# Patient Record
Sex: FEMALE | Race: AMERICAN INDIAN OR ALASKA NATIVE | Employment: UNEMPLOYED | URBAN - METROPOLITAN AREA
[De-identification: names, ages, dates, MRNs, and addresses within clinical notes are randomized per-mention and may not be internally consistent; named-entity substitution may affect disease eponyms.]

---

## 2022-03-23 ENCOUNTER — OFFICE VISIT (OUTPATIENT)
Dept: FAMILY MEDICINE | Facility: CLINIC | Age: 22
End: 2022-03-23
Payer: COMMERCIAL

## 2022-03-23 VITALS
BODY MASS INDEX: 43.12 KG/M2 | HEIGHT: 68 IN | DIASTOLIC BLOOD PRESSURE: 78 MMHG | WEIGHT: 284.5 LBS | HEART RATE: 90 BPM | SYSTOLIC BLOOD PRESSURE: 122 MMHG | TEMPERATURE: 98.9 F | OXYGEN SATURATION: 97 %

## 2022-03-23 DIAGNOSIS — Z78.9 PERSON LIVING IN RESIDENTIAL INSTITUTION: ICD-10-CM

## 2022-03-23 DIAGNOSIS — Z11.4 SCREENING FOR HIV (HUMAN IMMUNODEFICIENCY VIRUS): ICD-10-CM

## 2022-03-23 DIAGNOSIS — Z00.00 ROUTINE HISTORY AND PHYSICAL EXAMINATION OF ADULT: Primary | ICD-10-CM

## 2022-03-23 PROCEDURE — 87389 HIV-1 AG W/HIV-1&-2 AB AG IA: CPT

## 2022-03-23 PROCEDURE — 86481 TB AG RESPONSE T-CELL SUSP: CPT

## 2022-03-23 RX ORDER — ACETAMINOPHEN 325 MG/1
325-650 TABLET ORAL EVERY 6 HOURS PRN
COMMUNITY

## 2022-03-23 RX ORDER — LURASIDONE HYDROCHLORIDE 40 MG/1
TABLET, FILM COATED ORAL
COMMUNITY

## 2022-03-23 RX ORDER — SERTRALINE HYDROCHLORIDE 100 MG/1
100 TABLET, FILM COATED ORAL DAILY
COMMUNITY

## 2022-03-23 ASSESSMENT — ANXIETY QUESTIONNAIRES
GAD7 TOTAL SCORE: 0
2. NOT BEING ABLE TO STOP OR CONTROL WORRYING: NOT AT ALL
5. BEING SO RESTLESS THAT IT IS HARD TO SIT STILL: NOT AT ALL
6. BECOMING EASILY ANNOYED OR IRRITABLE: NOT AT ALL
7. FEELING AFRAID AS IF SOMETHING AWFUL MIGHT HAPPEN: NOT AT ALL
3. WORRYING TOO MUCH ABOUT DIFFERENT THINGS: NOT AT ALL
IF YOU CHECKED OFF ANY PROBLEMS ON THIS QUESTIONNAIRE, HOW DIFFICULT HAVE THESE PROBLEMS MADE IT FOR YOU TO DO YOUR WORK, TAKE CARE OF THINGS AT HOME, OR GET ALONG WITH OTHER PEOPLE: NOT DIFFICULT AT ALL
1. FEELING NERVOUS, ANXIOUS, OR ON EDGE: NOT AT ALL

## 2022-03-23 ASSESSMENT — PATIENT HEALTH QUESTIONNAIRE - PHQ9
SUM OF ALL RESPONSES TO PHQ QUESTIONS 1-9: 6
5. POOR APPETITE OR OVEREATING: NOT AT ALL

## 2022-03-23 NOTE — NURSING NOTE
"ROOM:1    Preferred Name: Ida     21 year old  Chief Complaint   Patient presents with     Physical       Blood pressure 122/78, pulse 90, temperature 98.9  F (37.2  C), temperature source Oral, height 1.715 m (5' 7.5\"), weight 129 kg (284 lb 8 oz), last menstrual period 08/15/2021, SpO2 97 %. Body mass index is 43.9 kg/m .      There is no problem list on file for this patient.      Wt Readings from Last 2 Encounters:   03/23/22 129 kg (284 lb 8 oz)     BP Readings from Last 3 Encounters:   03/23/22 122/78       Allergies   Allergen Reactions     Lactose        Current Outpatient Medications   Medication     acetaminophen (TYLENOL) 325 MG tablet     lurasidone (LATUDA) 40 MG TABS tablet     Prenatal MV-Min-Fe Fum-FA-DHA (PRENATAL 1 PO)     sertraline (ZOLOFT) 100 MG tablet     No current facility-administered medications for this visit.       Social History     Tobacco Use     Smoking status: Former Smoker     Smokeless tobacco: Never Used   Vaping Use     Vaping Use: Former   Substance Use Topics     Alcohol use: Not Currently     Drug use: Not Currently     Types: Opiates, Methamphetamines       Honoring Choices - Health Care Directive Guide offered to patient at time of visit.    Health Maintenance Due   Topic Date Due     PREVENTIVE CARE VISIT  Never done     CHLAMYDIA SCREENING  Never done     ADVANCE CARE PLANNING  Never done     HIV SCREENING  Never done     HEPATITIS C SCREENING  Never done     PAP  Never done     COVID-19 Vaccine (3 - Booster for Pfizer series) 12/14/2021       Immunization History   Administered Date(s) Administered     COVID-19,PF,Pfizer (12+ Yrs) 05/25/2021, 07/14/2021     DTAP (<7y) 1998, 03/09/1999, 04/20/1999, 01/04/2000     DTaP / Hep B / IPV 09/01/2004     Flu, Unspecified 01/28/2010     HPV Quadrivalent 12/19/2012, 03/22/2016     HepA-Adult 01/28/2010     HepA-ped 2 Dose 09/01/2004     HepB, Unspecified 1998, 04/20/1999     HepB-Adult 02/21/2020     Historic Hib " Hib-titer 1998, 03/09/1999     Influenza (H1N1) 01/28/2010     Influenza Vaccine IM > 6 months Valent IIV4 (Alfuria,Fluzone) 02/21/2020, 12/13/2021     MMR 11/02/1999, 09/01/2004     Meningococcal (Menactra ) 12/19/2012, 03/22/2016     Pedvax-hib 11/02/1999     Poliovirus, inactivated (IPV) 1998, 03/09/1999, 01/04/2000     TD (ADULT, 7+) 02/21/2020     Tdap (Adacel,Boostrix) 01/28/2010, 03/01/2022     Varicella 11/02/1999, 12/19/2012       No results found for: PAP      No lab results found.    No flowsheet data found.    PHQ-9 SCORE 3/23/2022   PHQ-9 Total Score 6       LALO-7 SCORE 3/23/2022   Total Score 0       No flowsheet data found.      Rosana Alva, [facility]  March 23, 2022 12:56 PM

## 2022-03-23 NOTE — PROGRESS NOTES
Patient: Ida Bruce YOB: 2000  Date of Exam:    Arrival Time: 12 34 PM  Departure Time:  PM    Ida Bruce is a 21 year old  who is 31w3d here to establish care.  CARTER 20. She has a PMH of polysubstance abuse (IV meth, heroin, ETOH), hep C, abnormal PAP, syphilis, and auditory hallucinations. She is currently in substance abuse treatment at [facility]. She arrived at  on 3/16 as a transfer from another treatment center (2021-3/15/2022). Was in [facility] from -2021). She has been sober from drugs and alcohol since 2021. No cigarette use since . Plans to enroll in year long treatment program. Wants to come to [facility] clinic for her primary care after delivery.    Previously received regular prenatal care at [facility] in [facility]. She has an appt with [facility] tomorrow to re-establish prenatal care.     PMH  Hx of abnormal PAP smear (not sure of the results)  Hep C positive (found out while she was pregnant, has not received tx)  Hx of syphilis treatment (treated May 2019 and 2021), has not been with partner since treatment    Concerns for Eating Disorder  Gained 60 lbs since pregnancy, watching her diet but not sure what to eat  Hx of eating disorder when using drugs in the past, she would not eat.   In high school had a eating disorder (excessive eating;no bulimia or anorexia)  Counseler at [facility] is referring her to the [facility]    Mood/Support  Hx of anxiety and depression  Hx of auditory hallucinations. No other types of hallucinations (visual, tactile)  Currently on Lurasidone for hallucinations. Significant decrease in frequency of hallucinations. Does not notice them as much anymore. No command hallucinations. No SI/HI.  Has a , very supportive, also a close friend Ritika (also in treatment at [facility])  Partner not involved    ROS  No vaginal bleeding, abnormal discharge, or leaking of fluids  Feels baby  "move frequently, no change in movement  No swelling of hands or feet, no HA or visual changes  Intermittently nauseous with fatty foods or high carb foods  No vomiting, diarrhea or constipation  Skin: negative  Eyes: negative  Ears/Nose/Throat: negative  Respiratory: No shortness of breath, dyspnea on exertion, cough, or hemoptysis   Cardiovascular: negative  Gastrointestinal: negative  Genitourinary: negative  Musculoskeletal: negative  Neurologic: negative  Hematologic/Lymphatic/Immunologic: negative  Endocrine: negative    Allergies:   Allergies   Allergen Reactions     Lactose        Patient Concerns:   Chief Complaint   Patient presents with     Physical       Immunizations:   Most Recent Immunizations   Administered Date(s) Administered     COVID-19,PF,Pfizer (12+ Yrs) 07/14/2021     DTAP (<7y) 01/04/2000     DTaP / Hep B / IPV 09/01/2004     Flu, Unspecified 01/28/2010     HPV Quadrivalent 03/22/2016     HepA-Adult 01/28/2010     HepA-ped 2 Dose 09/01/2004     HepB, Unspecified 04/20/1999     HepB-Adult 02/21/2020     Historic Hib Hib-titer 03/09/1999     Influenza (H1N1) 01/28/2010     Influenza Vaccine IM > 6 months Valent IIV4 (Alfuria,Fluzone) 12/13/2021     MMR 09/01/2004     Meningococcal (Menactra ) 03/22/2016     Pedvax-hib 11/02/1999     Poliovirus, inactivated (IPV) 01/04/2000     TD (ADULT, 7+) 02/21/2020     Tdap (Adacel,Boostrix) 03/01/2022     Varicella 12/19/2012       Do you need any refills on your Medications today? No    Free of communicable diseases? No, has hep C. Very low risk of transmission to others at treatment center. Mainly transmitted through sharing of needles.     Health Maintenance:   -No past medical records available at this time. Unable to verify HM needs.    General Physical Exam:  Vitals: /78   Pulse 90   Temp 98.9  F (37.2  C) (Oral)   Ht 1.715 m (5' 7.5\")   Wt 129 kg (284 lb 8 oz)   LMP 08/15/2021   SpO2 97%   BMI 43.90 kg/m    Past Medical History Reviewed? " Yes.  Physical Exam  Constitutional:       General: She is not in acute distress.     Appearance: Normal appearance. She is obese. She is not ill-appearing, toxic-appearing or diaphoretic.   HENT:      Head: Normocephalic.      Right Ear: Tympanic membrane, ear canal and external ear normal.      Left Ear: Tympanic membrane, ear canal and external ear normal.      Nose: Nose normal. No congestion or rhinorrhea.      Mouth/Throat:      Mouth: Mucous membranes are dry.      Pharynx: Oropharynx is clear.   Eyes:      Extraocular Movements: Extraocular movements intact.      Conjunctiva/sclera: Conjunctivae normal.      Pupils: Pupils are equal, round, and reactive to light.   Cardiovascular:      Rate and Rhythm: Normal rate and regular rhythm.      Heart sounds: Normal heart sounds. No murmur heard.    No friction rub. No gallop.   Pulmonary:      Effort: Pulmonary effort is normal. No respiratory distress.      Breath sounds: No stridor. No wheezing, rhonchi or rales.   Musculoskeletal:      Cervical back: Normal range of motion.      Right lower leg: No edema.      Left lower leg: No edema.   Skin:     General: Skin is warm and dry.   Neurological:      Mental Status: She is alert and oriented to person, place, and time.   Psychiatric:         Behavior: Behavior normal.         Thought Content: Thought content normal.         Judgment: Judgment normal.     Recommended Diet and Special Instructions: No  Limitations or restrictions for activities: No  Information Pertinent to treatment in case of emergency None    Diagnoses:   (Z00.00) Routine history and physical examination of adult  (primary encounter diagnosis)  Plan: Due to the limitations of providing care without past medical records, unable to make further recommendations for the patient's care at this time. Pt is establishing with OB/GYN tomorrow. Will defer management of hx of abnormal PAPs, syphilis, and hep C treatment to OB care team after they receive  her medical records. No red flag symptoms based on exam or HPI.     (Z59.3) Person living in residential institution  Plan: Quantiferon-TB Gold Plus    (Z11.4) Screening for HIV (human immunodeficiency virus)  Plan: HIV Antigen Antibody Combo      Medication Changes: MEDICATIONS:        - Continue other medications without change    Referrals   NO REFERRALS MADE TODAY    Follow up plan   Follow up as needed. Patient will primarily receive care with OB/GYN during pregnancy and immediately postpartum. Likely considered high risk pregnancy.        Cara Carpio NP

## 2022-03-24 LAB — HIV 1+2 AB+HIV1 P24 AG SERPL QL IA: NONREACTIVE

## 2022-03-24 ASSESSMENT — ANXIETY QUESTIONNAIRES: GAD7 TOTAL SCORE: 0

## 2022-03-25 LAB
GAMMA INTERFERON BACKGROUND BLD IA-ACNC: 0.05 IU/ML
M TB IFN-G BLD-IMP: NEGATIVE
M TB IFN-G CD4+ BCKGRND COR BLD-ACNC: 5.63 IU/ML
MITOGEN IGNF BCKGRD COR BLD-ACNC: -0.01 IU/ML
MITOGEN IGNF BCKGRD COR BLD-ACNC: 0 IU/ML
QUANTIFERON MITOGEN: 5.68 IU/ML
QUANTIFERON NIL TUBE: 0.05 IU/ML
QUANTIFERON TB1 TUBE: 0.05 IU/ML
QUANTIFERON TB2 TUBE: 0.04